# Patient Record
Sex: FEMALE | Race: WHITE | NOT HISPANIC OR LATINO | Employment: PART TIME | URBAN - METROPOLITAN AREA
[De-identification: names, ages, dates, MRNs, and addresses within clinical notes are randomized per-mention and may not be internally consistent; named-entity substitution may affect disease eponyms.]

---

## 2017-10-19 ENCOUNTER — HOSPITAL ENCOUNTER (OUTPATIENT)
Dept: URGENT CARE | Facility: CLINIC | Age: 27
Discharge: HOME | End: 2017-10-19
Attending: FAMILY MEDICINE | Admitting: FAMILY MEDICINE

## 2017-11-03 ENCOUNTER — ON CAMPUS - OUTPATIENT (OUTPATIENT)
Dept: URBAN - METROPOLITAN AREA HOSPITAL 85 | Facility: HOSPITAL | Age: 27
End: 2017-11-03
Payer: COMMERCIAL

## 2017-11-03 DIAGNOSIS — R10.33 PERIUMBILICAL PAIN: ICD-10-CM

## 2017-11-03 DIAGNOSIS — R11.0 NAUSEA: ICD-10-CM

## 2017-11-03 DIAGNOSIS — K59.00 CONSTIPATION, UNSPECIFIED: ICD-10-CM

## 2017-11-03 DIAGNOSIS — R31.9 HEMATURIA, UNSPECIFIED: ICD-10-CM

## 2017-11-03 PROCEDURE — 99204 OFFICE O/P NEW MOD 45 MIN: CPT | Performed by: NURSE PRACTITIONER

## 2017-11-29 ENCOUNTER — HOSPITAL ENCOUNTER (OUTPATIENT)
Dept: FAMILY MEDICINE CLINIC | Facility: CLINIC | Age: 27
Setting detail: SPECIMEN
Discharge: HOME | End: 2017-11-29
Attending: INTERNAL MEDICINE | Admitting: INTERNAL MEDICINE

## 2017-11-29 LAB
ALBUMIN SERPL-MCNC: 4.2 G/DL (ref 3.5–4.8)
ALBUMIN/GLOB SERPL: 1.4 {RATIO} (ref 1–1.7)
ALP SERPL-CCNC: 105 IU/L (ref 32–91)
ALT SERPL-CCNC: 24 IU/L (ref 14–54)
ANION GAP SERPL CALC-SCNC: 9.8 MMOL/L (ref 10–20)
AST SERPL-CCNC: 18 IU/L (ref 15–41)
BASOPHILS # BLD AUTO: 0.1 10*3/UL (ref 0–0.2)
BASOPHILS NFR BLD AUTO: 1 % (ref 0–2)
BILIRUB SERPL-MCNC: 0.4 MG/DL (ref 0.3–1.2)
BUN SERPL-MCNC: 10 MG/DL (ref 8–20)
BUN/CREAT SERPL: 16.7 (ref 5.4–26.2)
CALCIUM SERPL-MCNC: 9.3 MG/DL (ref 8.9–10.3)
CHLORIDE SERPL-SCNC: 106 MMOL/L (ref 101–111)
CONV CO2: 26 MMOL/L (ref 22–32)
CONV TOTAL PROTEIN: 7.1 G/DL (ref 6.1–7.9)
CREAT UR-MCNC: 0.6 MG/DL (ref 0.4–1)
DIFFERENTIAL METHOD BLD: (no result)
EOSINOPHIL # BLD AUTO: 0.1 10*3/UL (ref 0–0.3)
EOSINOPHIL # BLD AUTO: 1 % (ref 0–3)
ERYTHROCYTE [DISTWIDTH] IN BLOOD BY AUTOMATED COUNT: 13.8 % (ref 11.5–14.5)
GLOBULIN UR ELPH-MCNC: 2.9 G/DL (ref 2.5–3.8)
GLUCOSE SERPL-MCNC: 88 MG/DL (ref 65–99)
HCT VFR BLD AUTO: 44.8 % (ref 35–49)
HGB BLD-MCNC: 14.7 G/DL (ref 12–15)
LIPASE SERPL-CCNC: 25 U/L (ref 22–51)
LYMPHOCYTES # BLD AUTO: 3.1 10*3/UL (ref 0.8–4.8)
LYMPHOCYTES NFR BLD AUTO: 28 % (ref 18–42)
MCH RBC QN AUTO: 28.1 PG (ref 26–32)
MCHC RBC AUTO-ENTMCNC: 32.7 G/DL (ref 32–36)
MCV RBC AUTO: 85.7 FL (ref 80–94)
MONOCYTES # BLD AUTO: 0.9 10*3/UL (ref 0.1–1.3)
MONOCYTES NFR BLD AUTO: 8 % (ref 2–11)
NEUTROPHILS # BLD AUTO: 6.9 10*3/UL (ref 2.3–8.6)
NEUTROPHILS NFR BLD AUTO: 62 % (ref 50–75)
NRBC BLD AUTO-RTO: 0 /100{WBCS}
NRBC/RBC NFR BLD MANUAL: 0 10*3/UL
PLATELET # BLD AUTO: 257 10*3/UL (ref 150–450)
PMV BLD AUTO: 9.1 FL (ref 7.4–10.4)
POTASSIUM SERPL-SCNC: 3.8 MMOL/L (ref 3.6–5.1)
RBC # BLD AUTO: 5.23 10*6/UL (ref 4–5.4)
SODIUM SERPL-SCNC: 138 MMOL/L (ref 136–144)
WBC # BLD AUTO: (no result) 10*3/UL (ref 4.5–11.5)

## 2019-05-23 ENCOUNTER — CONVERSION ENCOUNTER (OUTPATIENT)
Dept: FAMILY MEDICINE CLINIC | Facility: CLINIC | Age: 29
End: 2019-05-23

## 2019-06-04 VITALS
SYSTOLIC BLOOD PRESSURE: 118 MMHG | HEART RATE: 74 BPM | RESPIRATION RATE: 16 BRPM | WEIGHT: 222.25 LBS | OXYGEN SATURATION: 74 % | BODY MASS INDEX: 39.37 KG/M2 | DIASTOLIC BLOOD PRESSURE: 72 MMHG

## 2019-06-06 NOTE — PROGRESS NOTES
Vital Signs:    Patient Profile:    29 Years Old Female  Height:     63 inches  Weight:     222.25 pounds  BMI:        39.37     O2 Sat:     74 %  Temp:       97.9 degrees F oral  Pulse rate: 74 / minute  Resp:       16 per minute  BP Sittin / 72  (left arm)        Problems: Active problems were reviewed with the patient during this visit.  Medications: Medications were reviewed with the patient during this visit.  Allergies: Allergies were reviewed with the patient during this visit.        Vitals Entered By: Anahi JACKSON  (May 23, 2019 1:52 PM)      Referring Provider:  Refugio Boyd MD  Primary Provider:  Fredy REA MD    CC:  back pain.    History of Present Illness:  Pt presents for 6 month h/o intermittent low to mid back pain. States that back pain initially started after falling down flight of stairs (lost her balance) when she was 34 weeks pregnant. Denies head trauma and fell on back. Has not gotten any imaging of   her back. States that the pain intensity has increased over the past 2 weeks. Denies any heavy lifting except for her 18lb baby. Denies parethesias,fevers, urinancy or bowel incontinence. Reports occasional shooting pains down the back portion of her legs   to her knees. Denies weakness or foot drop. Pain rated 7/10 and occasionally wakes her up at night for the past 3 weeks (2x/week) which she states feels like a muscle spasm. Takes 800mg of ibuprofen for the past couple of day and occasional tylenol. 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 Back pain is worse with bending over. Has noted no improvement with muscle relaxant.       Past Medical History:     Reviewed history from 10/03/2011 and no changes required:        pancreatitis    Past Surgical History:     Reviewed history from 2017 and no changes required:        cholecystectomy        Adenoids        Tonsillectomy    Family History Summary:      Reviewed history Last on 2019 and no changes  required:05/24/2019  Mother - Has No family history of clinical finding - Entered On: 9/21/2017      Social History:     Reviewed history from 04/12/2018 and no changes required:        Patient is a former smoker.        Passive Smoke: Y        Alcohol Use: N        Drug Use: N        HIV/High Risk: N        Regular Exercise: N        Hx Domestic Abuse: N        Muslim Affecting Care: N        Marital Status: Single        Children:         Occupation:         Risk Factors:     Smoked Tobacco Use:  Never smoker  Smokeless Tobacco Use:  Never      Review of Systems     The patient denies fever, weakness, blurring, diplopia, sore throat, hoarseness, chest pain, palpitations, peripheral edema, cough, wheezing, nausea, vomiting, diarrhea, constipation, change in bowel habits, abdominal pain, dysuria, hematuria, urinary   frequency and urinary hesitancy.        Physical Exam   Weight:  220.13  BP:  118/72 mm HG    Medication List:  MEDROL 4 MG ORAL TABLET THERAPY PACK (METHYLPREDNISOLONE) take 1 pack as directed  SERTRALINE  MG ORAL TABLET (SERTRALINE HCL) 1 1/2 tablet by mouth daily  ETODOLAC 400 MG ORAL TABLET (ETODOLAC) 1 tablet 2 times a day with food  RANITIDINE  MG ORAL TABLET (RANITIDINE HCL) Take one (1) tablet by mouth daily.  NORETHINDRONE ACET-ETHINYL EST 1-20 MG-MCG ORAL TABLET (NORETHINDRONE ACET-ETHINYL EST) taek as directed  PRENATAL/IRON TABLET (PRENATAL VIT-FE FUMARATE-FA TABS)       Surgical History   cholecystectomy  Adenoids  Tonsillectomy  ,    Risk Factors  Tobacco Use: Never smoker  Passive smoke exposure: yes  Exercise: no  Illicit Drug use: no      Physical Examination   General Appearance   In no acute distress.  Alert & oriented.  Behavior and affect appropriate to situation  Skin   HEENT   PERRLA, EOMI, Pharynx clear  Neck   Supple.  No adenopathy  Cardiovascular   Regular rate and rhythm  Lungs   Clear to auscultation  Back   Musculoskeletal   Gait and station normal, with  adequate muscle strength and tone.  Normal ROM to neck, back and extremities. Patellar reflexes 2+ bilaterally. Paraspinal muscle TTP (R>L) in mid to lower back. Negative straight leg test bilaterally.   Neurologic   Cranial nerves 2-12 grossly intact.   Psych   Alert and cooperative; normal mood and affect; normal attention span and concentration        Impression & Recommendations:    Problem # 1:  Back pain, chronic (ICD-724.5) (UVF45-Q80.89)  Presenting with 6 month h/o intermittent back pain which began after falling down stairs. Exam in unremarkable except for paraspinal TTP (R>L). Do no suspect disc herniation since pt had negative straight leg raise test bilaterally. Due to duration of   symptoms ordered thoracic and lumbar XR to evaluate for occult fracture or dislocation. No warning symptoms (eg fevers, motor deficits, urinary or bowel symptoms). Since pt has not had any improvement with muscle relaxant will prescribe Medrol dosepak.   Encouraged continued supportive care with heating pad. If pt does not improve with steroids and XRs are normal will refer to PT.   Her updated medication list for this problem includes:     Etodolac 400 Mg Oral Tablet (Etodolac) ..... 1 tablet 2 times a day with food      Medications Added to Medication List This Visit:  1)  Medrol 4 Mg Oral Tablet Therapy Pack (Methylprednisolone) .... Take 1 pack as directed                  Medication Administration    Orders Added:  1)  Ofc Vst, Est Level III [21902]        Electronically signed by Kirstie Daniel MD on 05/24/2019 at 8:46 AM  ________________________________________________________________________       Disclaimer: Converted Note message may not contain all data elements that existed in the legacy source system. Please see [employer] for the original note details.

## 2019-08-13 PROBLEM — F41.9 ANXIETY: Status: ACTIVE | Noted: 2017-11-29

## 2019-08-13 PROBLEM — M54.89 OTHER DORSALGIA: Status: ACTIVE | Noted: 2019-05-23

## 2019-08-13 PROBLEM — J38.5 LARYNGOSPASMS: Status: ACTIVE | Noted: 2018-04-12

## 2019-08-13 RX ORDER — NORETHINDRONE ACETATE AND ETHINYL ESTRADIOL 1; .02 MG/1; MG/1
TABLET ORAL
COMMUNITY
Start: 2017-11-29 | End: 2020-09-24

## 2019-08-13 RX ORDER — SERTRALINE HYDROCHLORIDE 100 MG/1
TABLET, FILM COATED ORAL EVERY 24 HOURS
COMMUNITY
Start: 2019-04-22 | End: 2019-12-01 | Stop reason: SDUPTHER

## 2019-08-13 RX ORDER — RANITIDINE 300 MG/1
TABLET ORAL EVERY 24 HOURS
COMMUNITY
Start: 2018-04-12 | End: 2020-09-24

## 2019-08-14 ENCOUNTER — OFFICE VISIT (OUTPATIENT)
Dept: FAMILY MEDICINE CLINIC | Facility: CLINIC | Age: 29
End: 2019-08-14

## 2019-08-14 ENCOUNTER — LAB (OUTPATIENT)
Dept: FAMILY MEDICINE CLINIC | Facility: CLINIC | Age: 29
End: 2019-08-14

## 2019-08-14 VITALS
HEART RATE: 72 BPM | BODY MASS INDEX: 40.92 KG/M2 | SYSTOLIC BLOOD PRESSURE: 120 MMHG | DIASTOLIC BLOOD PRESSURE: 80 MMHG | TEMPERATURE: 98.2 F | RESPIRATION RATE: 8 BRPM | WEIGHT: 231 LBS

## 2019-08-14 DIAGNOSIS — E28.2 PCOS (POLYCYSTIC OVARIAN SYNDROME): Primary | ICD-10-CM

## 2019-08-14 DIAGNOSIS — M54.50 LUMBAR BACK PAIN: ICD-10-CM

## 2019-08-14 DIAGNOSIS — E28.2 PCOS (POLYCYSTIC OVARIAN SYNDROME): ICD-10-CM

## 2019-08-14 LAB
ESTRADIOL SERPL HS-MCNC: 169 PG/ML
FSH SERPL-ACNC: 2.96 MIU/ML
LH SERPL-ACNC: 12.62 MIU/ML
PROGEST SERPL-MCNC: 10.05 NG/ML
TSH SERPL DL<=0.05 MIU/L-ACNC: 0.99 MIU/ML (ref 0.34–5.6)

## 2019-08-14 PROCEDURE — 83002 ASSAY OF GONADOTROPIN (LH): CPT | Performed by: INTERNAL MEDICINE

## 2019-08-14 PROCEDURE — 99214 OFFICE O/P EST MOD 30 MIN: CPT | Performed by: INTERNAL MEDICINE

## 2019-08-14 PROCEDURE — 82670 ASSAY OF TOTAL ESTRADIOL: CPT | Performed by: INTERNAL MEDICINE

## 2019-08-14 PROCEDURE — 83001 ASSAY OF GONADOTROPIN (FSH): CPT | Performed by: INTERNAL MEDICINE

## 2019-08-14 PROCEDURE — 84443 ASSAY THYROID STIM HORMONE: CPT | Performed by: INTERNAL MEDICINE

## 2019-08-14 PROCEDURE — 36415 COLL VENOUS BLD VENIPUNCTURE: CPT

## 2019-08-14 PROCEDURE — 84144 ASSAY OF PROGESTERONE: CPT | Performed by: INTERNAL MEDICINE

## 2019-08-14 NOTE — PROGRESS NOTES
Rooming Tab(CC,VS,Pt Hx,Fall Screen)  Chief Complaint   Patient presents with   • Generalized Body Aches       Subjective    Pt here because still no cycle- increased acne and sleep issues.   No post partum-  Issues- on SSRI and doing well.  normal libido   also never xray as wasn't sure that order had been sent-  Was  Still having lower back pain-   No radiating pain most days- jsut stays in lower back/buttocks region. Lifting 2 children now  I have reviewed and updated her medications, medical history and problem list during today's office visit.     Patient Care Team:  Kirstie Daniel MD as PCP - General    Problem List Tab  Medications Tab  Synopsis Tab  Chart Review Tab  Care Everywhere Tab  Immunizations Tab  Patient History Tab    Social History     Tobacco Use   • Smoking status: Light Tobacco Smoker     Types: Cigarettes   • Smokeless tobacco: Never Used   Substance Use Topics   • Alcohol use: No     Frequency: Never       Review of Systems   Constitutional: Negative for fatigue.   Cardiovascular: Negative for chest pain.   Psychiatric/Behavioral: Positive for sleep disturbance.       Objective     Rooming Tab(CC,VS,Pt Hx,Fall Screen)  /80   Pulse 72   Temp 98.2 °F (36.8 °C) (Oral)   Resp 8   Wt 105 kg (231 lb)   BMI 40.92 kg/m²     Body mass index is 40.92 kg/m².    Physical Exam   Constitutional: She appears well-developed and well-nourished.   HENT:   Head: Normocephalic.   Eyes: Pupils are equal, round, and reactive to light.   Neck: Normal range of motion. Neck supple.   Cardiovascular: Normal rate and regular rhythm.   No murmur heard.  Pulmonary/Chest: Effort normal and breath sounds normal. No respiratory distress.   Abdominal: Soft. Bowel sounds are normal. She exhibits no distension.   Musculoskeletal: She exhibits tenderness.   Tender  Midline    Skin: Capillary refill takes less than 2 seconds.   Back acne   Psychiatric: She has a normal mood and affect.   Nursing note and  vitals reviewed.       Statin Choice Calculator  Data Reviewed:    Xr Spine Lumbar Ap & Lateral    Result Date: 8/15/2019  Impression: Negative lumbosacral spine.  Electronically Signed By-Lavell Piper On:8/15/2019 4:16 PM This report was finalized on [id number] by  Lavell Piper, .            Lab Results   Component Value Date    TSH 0.990 08/14/2019      Assessment/Plan   Order Review Tab  Health Maintenance Tab  Patient Plan/Order Tab  Diagnoses and all orders for this visit:    1. PCOS (polycystic ovarian syndrome) (Primary)  Comments:  check labs  Orders:  -     Estradiol; Future  -     Follicle Stimulating Hormone  -     Luteinizing Hormone; Future  -     Progesterone; Future  -     TSH    2. Lumbar back pain  Comments:  check xray as fell hard  in NOvember and pain started then  Orders:  -     XR Spine Lumbar AP & Lateral; Future        Wrapup Tab  Return in about 6 months (around 2/14/2020).           They were informed of the diagnosis and treatment plan and directed to f/u for any further problems or concerns.  Recommend increase exercise and weight reduction

## 2019-08-15 ENCOUNTER — HOSPITAL ENCOUNTER (OUTPATIENT)
Dept: GENERAL RADIOLOGY | Facility: HOSPITAL | Age: 29
Discharge: HOME | End: 2019-08-15
Admitting: INTERNAL MEDICINE

## 2019-08-15 DIAGNOSIS — M54.50 LUMBAR BACK PAIN: ICD-10-CM

## 2019-08-15 PROCEDURE — 72100 X-RAY EXAM L-S SPINE 2/3 VWS: CPT

## 2019-08-21 ENCOUNTER — TELEPHONE (OUTPATIENT)
Dept: FAMILY MEDICINE CLINIC | Facility: CLINIC | Age: 29
End: 2019-08-21

## 2019-08-22 NOTE — TELEPHONE ENCOUNTER
Xray shows just muscular issues- spine bones and disc look good    Hormones show luteal phase - she just isn't have an LH surge- would do birth control pills to start regular cycles if doesn't start on own soon.

## 2019-09-24 RX ORDER — TIZANIDINE 4 MG/1
TABLET ORAL
Qty: 30 TABLET | Refills: 1 | Status: SHIPPED | OUTPATIENT
Start: 2019-09-24 | End: 2020-01-06

## 2019-12-02 RX ORDER — SERTRALINE HYDROCHLORIDE 100 MG/1
TABLET, FILM COATED ORAL
Qty: 45 TABLET | Refills: 3 | Status: SHIPPED | OUTPATIENT
Start: 2019-12-02 | End: 2020-04-23

## 2019-12-26 ENCOUNTER — TELEPHONE (OUTPATIENT)
Dept: FAMILY MEDICINE CLINIC | Facility: CLINIC | Age: 29
End: 2019-12-26

## 2019-12-26 NOTE — TELEPHONE ENCOUNTER
Patient fell and sprained her right ankle.  She went to the ER last weekend and forgot to get a work note.  She called our on-call service on 12/21 and spoke with one of our male doctors who said we would provide her a work note.  Needs the work note to be off 12/23-12/27 and it needs faxed to [phone].  She is trying to walk with crutches and cannot drive yet.

## 2019-12-27 ENCOUNTER — DOCUMENTATION (OUTPATIENT)
Dept: FAMILY MEDICINE CLINIC | Facility: CLINIC | Age: 29
End: 2019-12-27

## 2020-01-06 RX ORDER — TIZANIDINE 4 MG/1
TABLET ORAL
Qty: 30 TABLET | Refills: 6 | Status: SHIPPED | OUTPATIENT
Start: 2020-01-06 | End: 2020-09-24

## 2020-01-09 DIAGNOSIS — S63.642A SPRAIN OF METACARPOPHALANGEAL (MCP) JOINT OF LEFT THUMB, INITIAL ENCOUNTER: Primary | ICD-10-CM

## 2020-01-09 RX ORDER — OSELTAMIVIR PHOSPHATE 75 MG/1
75 CAPSULE ORAL DAILY
Qty: 10 CAPSULE | Refills: 0 | Status: SHIPPED | OUTPATIENT
Start: 2020-01-09 | End: 2020-09-24

## 2020-01-24 ENCOUNTER — TELEPHONE (OUTPATIENT)
Dept: FAMILY MEDICINE CLINIC | Facility: CLINIC | Age: 30
End: 2020-01-24

## 2020-01-24 RX ORDER — KETOCONAZOLE 20 MG/G
CREAM TOPICAL DAILY
Qty: 30 G | Refills: 1 | Status: SHIPPED | OUTPATIENT
Start: 2020-01-24 | End: 2020-09-24

## 2020-01-24 RX ORDER — NAFTIFINE HYDROCHLORIDE 20 MG/G
1 CREAM TOPICAL DAILY
Qty: 45 G | Refills: 1 | Status: SHIPPED | OUTPATIENT
Start: 2020-01-24 | End: 2020-09-24

## 2020-03-24 ENCOUNTER — OFFICE VISIT (OUTPATIENT)
Dept: FAMILY MEDICINE CLINIC | Facility: CLINIC | Age: 30
End: 2020-03-24

## 2020-03-24 DIAGNOSIS — R59.1 LYMPHADENOPATHY: Primary | ICD-10-CM

## 2020-03-24 DIAGNOSIS — L02.92 BOIL: Primary | ICD-10-CM

## 2020-03-24 LAB
BASOPHILS # BLD AUTO: 0.04 10*3/MM3 (ref 0–0.2)
BASOPHILS NFR BLD AUTO: 0.5 % (ref 0–1.5)
CRP SERPL-MCNC: 0.28 MG/DL (ref 0–0.5)
DEPRECATED RDW RBC AUTO: 40.4 FL (ref 37–54)
EOSINOPHIL # BLD AUTO: 0.12 10*3/MM3 (ref 0–0.4)
EOSINOPHIL NFR BLD AUTO: 1.6 % (ref 0.3–6.2)
ERYTHROCYTE [DISTWIDTH] IN BLOOD BY AUTOMATED COUNT: 13.1 % (ref 12.3–15.4)
HCT VFR BLD AUTO: 44.4 % (ref 34–46.6)
HGB BLD-MCNC: 14.7 G/DL (ref 12–15.9)
IMM GRANULOCYTES # BLD AUTO: 0.03 10*3/MM3 (ref 0–0.05)
IMM GRANULOCYTES NFR BLD AUTO: 0.4 % (ref 0–0.5)
LYMPHOCYTES # BLD AUTO: 2.66 10*3/MM3 (ref 0.7–3.1)
LYMPHOCYTES NFR BLD AUTO: 35.1 % (ref 19.6–45.3)
MCH RBC QN AUTO: 28.3 PG (ref 26.6–33)
MCHC RBC AUTO-ENTMCNC: 33.1 G/DL (ref 31.5–35.7)
MCV RBC AUTO: 85.4 FL (ref 79–97)
MONOCYTES # BLD AUTO: 0.48 10*3/MM3 (ref 0.1–0.9)
MONOCYTES NFR BLD AUTO: 6.3 % (ref 5–12)
NEUTROPHILS # BLD AUTO: 4.25 10*3/MM3 (ref 1.7–7)
NEUTROPHILS NFR BLD AUTO: 56.1 % (ref 42.7–76)
NRBC BLD AUTO-RTO: 0 /100 WBC (ref 0–0.2)
PLATELET # BLD AUTO: 255 10*3/MM3 (ref 140–450)
PMV BLD AUTO: 11 FL (ref 6–12)
RBC # BLD AUTO: 5.2 10*6/MM3 (ref 3.77–5.28)
WBC NRBC COR # BLD: 7.58 10*3/MM3 (ref 3.4–10.8)

## 2020-03-24 PROCEDURE — 85025 COMPLETE CBC W/AUTO DIFF WBC: CPT | Performed by: INTERNAL MEDICINE

## 2020-03-24 PROCEDURE — 99213 OFFICE O/P EST LOW 20 MIN: CPT | Performed by: INTERNAL MEDICINE

## 2020-03-24 PROCEDURE — 86140 C-REACTIVE PROTEIN: CPT | Performed by: INTERNAL MEDICINE

## 2020-03-25 VITALS — SYSTOLIC BLOOD PRESSURE: 122 MMHG | DIASTOLIC BLOOD PRESSURE: 80 MMHG

## 2020-03-25 PROBLEM — R59.1 LYMPHADENOPATHY: Status: ACTIVE | Noted: 2020-03-25

## 2020-03-25 NOTE — PROGRESS NOTES
Rooming Tab(CC,VS,Pt Hx,Fall Screen)  Chief Complaint   Patient presents with   • Pain       Subjective   PT presents for right axilla pain- noticed knot a couple weeks ago and not going away. Not red or hot. No known bug /spider bite.  Does have breast care in family and that concerns her.  I have reviewed and updated her medications, medical history and problem list during today's office visit.     Patient Care Team:  Kirstie Daniel MD as PCP - General    Problem List Tab  Medications Tab  Synopsis Tab  Chart Review Tab  Care Everywhere Tab  Immunizations Tab  Patient History Tab    Social History     Tobacco Use   • Smoking status: Light Tobacco Smoker     Packs/day: 0.25     Types: Cigarettes   • Smokeless tobacco: Never Used   Substance Use Topics   • Alcohol use: No     Frequency: Never       Review of Systems   Constitutional: Negative for fatigue.   Respiratory: Negative for apnea.    Cardiovascular: Negative for chest pain.   Gastrointestinal: Negative for abdominal distention.   Musculoskeletal: Positive for myalgias.   Hematological: Positive for adenopathy.       Objective     Rooming Tab(CC,VS,Pt Hx,Fall Screen)  /80     There is no height or weight on file to calculate BMI.    Physical Exam   Constitutional: She is oriented to person, place, and time. She appears well-developed and well-nourished.   HENT:   Head: Normocephalic and atraumatic.   Right Ear: Tympanic membrane normal.   Left Ear: Tympanic membrane normal.   Eyes: Pupils are equal, round, and reactive to light.   Neck: Normal range of motion. Neck supple.   Cardiovascular: Normal rate and regular rhythm.   No murmur heard.  Pulmonary/Chest: Effort normal and breath sounds normal.   Abdominal: Soft. Bowel sounds are normal. She exhibits no distension.   Musculoskeletal:   Right axilla with medial Lad palpable- tender to palpate. No redness or warmth. Not in breast tissue   Neurological: She is oriented to person, place, and  time.   Skin: Capillary refill takes less than 2 seconds.   Nursing note and vitals reviewed.       Statin Choice Calculator  Data Reviewed:               Lab Results   Component Value Date    WBC 7.58 03/24/2020    RBC 5.20 03/24/2020    HCT 44.4 03/24/2020    MCV 85.4 03/24/2020    MCH 28.3 03/24/2020      Assessment/Plan   Order Review Tab  Health Maintenance Tab  Patient Plan/Order Tab  Diagnoses and all orders for this visit:    1. Lymphadenopathy (Primary)  Comments:  will check cbc and crp- if normal- give 1 cycle and see if imporves- if doesn't will do US axilla in April        Wrapup Tab  Return if symptoms worsen or fail to improve.       They were informed of the diagnosis and treatment plan and directed to f/u for any further problems or concerns.

## 2020-04-01 ENCOUNTER — TELEPHONE (OUTPATIENT)
Dept: FAMILY MEDICINE CLINIC | Facility: CLINIC | Age: 30
End: 2020-04-01

## 2020-04-23 ENCOUNTER — TELEPHONE (OUTPATIENT)
Dept: FAMILY MEDICINE CLINIC | Facility: CLINIC | Age: 30
End: 2020-04-23

## 2020-04-23 ENCOUNTER — OFFICE VISIT (OUTPATIENT)
Dept: FAMILY MEDICINE CLINIC | Facility: CLINIC | Age: 30
End: 2020-04-23

## 2020-04-23 DIAGNOSIS — A08.39 GASTROENTERITIS DUE TO COVID-19 VIRUS: ICD-10-CM

## 2020-04-23 DIAGNOSIS — U07.1 GASTROENTERITIS DUE TO COVID-19 VIRUS: ICD-10-CM

## 2020-04-23 DIAGNOSIS — J40 BRONCHITIS: Primary | ICD-10-CM

## 2020-04-23 PROCEDURE — 87635 SARS-COV-2 COVID-19 AMP PRB: CPT | Performed by: PHYSICIAN ASSISTANT

## 2020-04-23 RX ORDER — AMOXICILLIN 875 MG/1
875 TABLET, COATED ORAL 2 TIMES DAILY
Qty: 20 TABLET | Refills: 0 | Status: SHIPPED | OUTPATIENT
Start: 2020-04-23 | End: 2020-05-03

## 2020-04-23 RX ORDER — PROMETHAZINE HYDROCHLORIDE 25 MG/1
25 TABLET ORAL EVERY 6 HOURS PRN
Qty: 25 TABLET | Refills: 2 | Status: SHIPPED | OUTPATIENT
Start: 2020-04-23 | End: 2020-09-24

## 2020-04-23 NOTE — TELEPHONE ENCOUNTER
Patient was just in the office to see , and she forgot that she needed a doctors note for her job.    Patient doesn't know how long  wants her to be off work    Needs the letter to state how long she is going to be off work as of today till who knows when..    If she could please fax it to her job at   [phone]

## 2020-04-23 NOTE — PROGRESS NOTES
Rooming Tab(CC,VS,Pt Hx,Fall Screen)  Chief Complaint   Patient presents with   • URI   • Fever       Subjective   Pt started with emesis and diarrhea x4 days.  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 No hematemesis or blood in stool.  Multiple times a day-= getting dehydrated  Whole body hurts-  Now with 104 fever this morning. Cough productive thick white. And sore throat. Keeping  sprite down only- no taste of  Big red or mint gum.   Chills and sweats.  Home with both kids- has not been back to client since Monday.  No pain with swallowing sprite. No rash, no syncope  I have reviewed and updated her medications, medical history and problem list during today's office visit.     Patient Care Team:  Kirstie Daniel MD as PCP - General    Problem List Tab  Medications Tab  Synopsis Tab  Chart Review Tab  Care Everywhere Tab  Immunizations Tab  Patient History Tab    Social History     Tobacco Use   • Smoking status: Light Tobacco Smoker     Packs/day: 0.25     Types: Cigarettes   • Smokeless tobacco: Never Used   Substance Use Topics   • Alcohol use: No     Frequency: Never       Review of Systems   HENT: Positive for congestion.    Cardiovascular: Negative for palpitations.       Objective     Rooming Tab(CC,VS,Pt Hx,Fall Screen)  LMP 04/02/2020     There is no height or weight on file to calculate BMI.    Physical Exam   Constitutional: She is oriented to person, place, and time.   Warm to touch and sweating   HENT:   Head: Atraumatic.   Cardiovascular: Normal rate and regular rhythm.   Pulmonary/Chest: Effort normal. No respiratory distress. She has wheezes.   Left sided post exhale scattered wheeze   Abdominal: She exhibits no distension.   Neurological: She is oriented to person, place, and time.   Skin: Capillary refill takes less than 2 seconds.   Psychiatric: She has a normal mood and affect.   Nursing note and vitals reviewed.        Statin Choice Calculator  Data Reviewed:               Lab Results   Component Value Date    WBC 7.58 03/24/2020    RBC 5.20 03/24/2020    HCT 44.4 03/24/2020    MCV 85.4 03/24/2020    MCH 28.3 03/24/2020      Assessment/Plan   Order Review Tab  Health Maintenance Tab  Patient Plan/Order Tab  Diagnoses and all orders for this visit:    1. Bronchitis (Primary)  Comments:  will treat with amoxil with pharyngitis and bronchitis- recommend getting COVID testing as been with elderly clients as well - will send down to [facility] today    2. Gastroenteritis due to COVID-19 virus    Other orders  -     promethazine (PHENERGAN) 25 MG tablet; Take 1 tablet by mouth Every 6 (Six) Hours As Needed for Nausea or Vomiting.  Dispense: 25 tablet; Refill: 2  -     amoxicillin (AMOXIL) 875 MG tablet; Take 1 tablet by mouth 2 (Two) Times a Day for 10 days.  Dispense: 20 tablet; Refill: 0        Wrapup Tab  No follow-ups on file.       They were informed of the diagnosis and treatment plan and directed to f/u for any further problems or concerns.

## 2020-04-23 NOTE — TELEPHONE ENCOUNTER
Please give work note from 4/20-/427.  If her test comes back positive then will change for 14 days after testingas well

## 2020-04-25 ENCOUNTER — TELEPHONE (OUTPATIENT)
Dept: URGENT CARE | Facility: CLINIC | Age: 30
End: 2020-04-25

## 2020-04-25 NOTE — TELEPHONE ENCOUNTER
----- Message from Thad Grissom MD sent at 4/25/2020 10:27 AM EDT -----  Please call the patient.    Testing negative, but this does NOT rule out COVID.  Continue home isolation.  Contact PCP for additional instructions.  See doctor or go to ER for any worsening.

## 2020-09-24 ENCOUNTER — OFFICE VISIT (OUTPATIENT)
Dept: FAMILY MEDICINE CLINIC | Facility: CLINIC | Age: 30
End: 2020-09-24

## 2020-09-24 VITALS
HEART RATE: 73 BPM | DIASTOLIC BLOOD PRESSURE: 76 MMHG | OXYGEN SATURATION: 97 % | SYSTOLIC BLOOD PRESSURE: 128 MMHG | BODY MASS INDEX: 40.57 KG/M2 | TEMPERATURE: 98.6 F | RESPIRATION RATE: 16 BRPM | WEIGHT: 229 LBS

## 2020-09-24 DIAGNOSIS — L73.2 HYDRADENITIS: Primary | ICD-10-CM

## 2020-09-24 PROCEDURE — 99213 OFFICE O/P EST LOW 20 MIN: CPT | Performed by: INTERNAL MEDICINE

## 2020-09-24 PROCEDURE — 87205 SMEAR GRAM STAIN: CPT | Performed by: INTERNAL MEDICINE

## 2020-09-24 PROCEDURE — 87070 CULTURE OTHR SPECIMN AEROBIC: CPT | Performed by: INTERNAL MEDICINE

## 2020-09-24 PROCEDURE — 87147 CULTURE TYPE IMMUNOLOGIC: CPT | Performed by: INTERNAL MEDICINE

## 2020-09-24 RX ORDER — ALUMINUM CHLORIDE 20 %
SOLUTION, NON-ORAL TOPICAL NIGHTLY
Qty: 60 ML | Refills: 2 | OUTPATIENT
Start: 2020-09-24 | End: 2021-04-27

## 2020-09-24 RX ORDER — SULFAMETHOXAZOLE AND TRIMETHOPRIM 800; 160 MG/1; MG/1
1 TABLET ORAL EVERY 12 HOURS
COMMUNITY
Start: 2020-09-14 | End: 2020-09-24

## 2020-09-26 LAB
BACTERIA SPEC AEROBE CULT: ABNORMAL
GRAM STN SPEC: ABNORMAL
GRAM STN SPEC: ABNORMAL

## 2020-11-05 ENCOUNTER — OFFICE VISIT (OUTPATIENT)
Dept: FAMILY MEDICINE CLINIC | Facility: CLINIC | Age: 30
End: 2020-11-05

## 2020-11-05 VITALS
TEMPERATURE: 97.5 F | RESPIRATION RATE: 16 BRPM | BODY MASS INDEX: 39.89 KG/M2 | WEIGHT: 225.2 LBS | HEART RATE: 71 BPM | OXYGEN SATURATION: 97 % | DIASTOLIC BLOOD PRESSURE: 84 MMHG | SYSTOLIC BLOOD PRESSURE: 138 MMHG

## 2020-11-05 DIAGNOSIS — L73.2 HYDRADENITIS: Primary | ICD-10-CM

## 2020-11-05 DIAGNOSIS — R59.1 LYMPHADENOPATHY: ICD-10-CM

## 2020-11-05 PROCEDURE — 99213 OFFICE O/P EST LOW 20 MIN: CPT | Performed by: INTERNAL MEDICINE

## 2020-11-05 RX ORDER — SULFAMETHOXAZOLE AND TRIMETHOPRIM 800; 160 MG/1; MG/1
1 TABLET ORAL 2 TIMES DAILY
Qty: 20 TABLET | Refills: 0 | OUTPATIENT
Start: 2020-11-05 | End: 2021-04-27

## 2020-11-05 NOTE — PROGRESS NOTES
Rooming Tab(CC,VS,Pt Hx,Fall Screen)  Chief Complaint   Patient presents with   • Recurrent Skin Infections       Subjective   Sweating non stop- has tried drysol and everything female and male-  No help   shaves only 1 week.  Still with the left axilla boil - not as hard - tender- bra irritates it so takes off at home.   I have reviewed and updated her medications, medical history and problem list during today's office visit.     Patient Care Team:  Kirstie Daniel MD as PCP - General    Problem List Tab  Medications Tab  Synopsis Tab  Chart Review Tab  Care Everywhere Tab  Immunizations Tab  Patient History Tab    Social History     Tobacco Use   • Smoking status: Light Tobacco Smoker     Packs/day: 0.25     Types: Cigarettes   • Smokeless tobacco: Never Used   Substance Use Topics   • Alcohol use: No     Frequency: Never       Review of Systems   Constitutional: Negative for fatigue.   Cardiovascular: Negative for chest pain.   Skin: Positive for rash.       Objective     Rooming Tab(CC,VS,Pt Hx,Fall Screen)  /84 (BP Location: Left arm, Patient Position: Sitting, Cuff Size: Adult)   Pulse 71   Temp 97.5 °F (36.4 °C) (Temporal)   Resp 16   Wt 102 kg (225 lb 3.2 oz)   SpO2 97%   BMI 39.89 kg/m²     Body mass index is 39.89 kg/m².    Physical Exam  Vitals signs and nursing note reviewed.   Constitutional:       Appearance: She is well-developed.   HENT:      Head: Normocephalic and atraumatic.      Right Ear: Tympanic membrane normal.      Left Ear: Tympanic membrane normal.   Eyes:      Pupils: Pupils are equal, round, and reactive to light.   Neck:      Musculoskeletal: Normal range of motion and neck supple.   Cardiovascular:      Rate and Rhythm: Normal rate and regular rhythm.      Heart sounds: No murmur.   Pulmonary:      Effort: Pulmonary effort is normal.      Breath sounds: Normal breath sounds.   Abdominal:      General: Bowel sounds are normal. There is no distension.      Palpations:  Abdomen is soft.   Skin:     Capillary Refill: Capillary refill takes less than 2 seconds.      Findings: Erythema and lesion present.      Comments: Enlarged LAD with mild redness and irritation in armpit   Neurological:      Mental Status: She is oriented to person, place, and time.          Statin Choice Calculator  Data Reviewed:                   Assessment/Plan   Order Review Tab  Health Maintenance Tab  Patient Plan/Order Tab  Diagnoses and all orders for this visit:    1. Hydradenitis (Primary)  Comments:  will give bactrim   use heat as needed   no enlarged LAD currently    2. Lymphadenopathy    Other orders  -     sulfamethoxazole-trimethoprim (Bactrim DS) 800-160 MG per tablet; Take 1 tablet by mouth 2 (Two) Times a Day.  Dispense: 20 tablet; Refill: 0        Wrapup Tab  Return if symptoms worsen or fail to improve.       They were informed of the diagnosis and treatment plan and directed to f/u for any further problems or concerns.

## 2021-04-27 PROCEDURE — U0003 INFECTIOUS AGENT DETECTION BY NUCLEIC ACID (DNA OR RNA); SEVERE ACUTE RESPIRATORY SYNDROME CORONAVIRUS 2 (SARS-COV-2) (CORONAVIRUS DISEASE [COVID-19]), AMPLIFIED PROBE TECHNIQUE, MAKING USE OF HIGH THROUGHPUT TECHNOLOGIES AS DESCRIBED BY CMS-2020-01-R: HCPCS | Performed by: NURSE PRACTITIONER

## 2021-05-04 ENCOUNTER — OFFICE VISIT (OUTPATIENT)
Dept: FAMILY MEDICINE CLINIC | Facility: CLINIC | Age: 31
End: 2021-05-04

## 2021-05-04 VITALS
DIASTOLIC BLOOD PRESSURE: 90 MMHG | HEART RATE: 93 BPM | TEMPERATURE: 97.1 F | OXYGEN SATURATION: 98 % | RESPIRATION RATE: 16 BRPM | WEIGHT: 226 LBS | BODY MASS INDEX: 40.04 KG/M2 | SYSTOLIC BLOOD PRESSURE: 130 MMHG | HEIGHT: 63 IN

## 2021-05-04 DIAGNOSIS — F41.9 ANXIETY: Primary | ICD-10-CM

## 2021-05-04 DIAGNOSIS — L73.2 HYDRADENITIS: ICD-10-CM

## 2021-05-04 PROCEDURE — 99213 OFFICE O/P EST LOW 20 MIN: CPT | Performed by: INTERNAL MEDICINE

## 2021-05-04 RX ORDER — LEVONORGESTREL / ETHINYL ESTRADIOL AND ETHINYL ESTRADIOL 150-30(84)
1 KIT ORAL DAILY
Qty: 90 EACH | Refills: 3 | Status: SHIPPED | OUTPATIENT
Start: 2021-05-04 | End: 2022-10-26

## 2021-08-17 ENCOUNTER — TELEPHONE (OUTPATIENT)
Dept: FAMILY MEDICINE CLINIC | Facility: CLINIC | Age: 31
End: 2021-08-17

## 2021-08-17 RX ORDER — FLUOXETINE HYDROCHLORIDE 20 MG/1
20 CAPSULE ORAL DAILY
Qty: 30 CAPSULE | Refills: 3 | Status: SHIPPED | OUTPATIENT
Start: 2021-08-17 | End: 2022-10-26

## 2021-08-17 NOTE — TELEPHONE ENCOUNTER
Caller: Jessie Byrd    Relationship: Self    Best call back number: [phone]     What medications are you currently taking:   Current Outpatient Medications on File Prior to Visit   Medication Sig Dispense Refill   • albuterol sulfate  (90 Base) MCG/ACT inhaler Inhale 2 puffs 4 (Four) Times a Day. 18 g 0   • brompheniramine-pseudoephedrine-DM (Bromfed DM) 30-2-10 MG/5ML syrup Take 5 mL by mouth 4 (Four) Times a Day As Needed for Congestion or Cough. 119 mL 0   • Levonorgest-Eth Estrad 91-Day 0.15-0.03 &0.01 MG tablet Take 1 tablet by mouth Daily. 90 each 3   • sertraline (Zoloft) 50 MG tablet Take 1 tablet by mouth Daily. 30 tablet 3     No current facility-administered medications on file prior to visit.          When did you start taking these medications: 3 MONTHS     Which medication are you concerned about: MOHIT     Who prescribed you this medication: DR. BOBBY    What are your concerns: MS. BYRD DISCONTINUED TAKING ZOLOFT BECAUSE IT WAS CAUSING HER MIGRAINES EVERYDAY, SHE WOULD LIKE TO KNOW IF THERE IS A DIFFERENT MEDICATION THAT SHE COULD TAKE FOR HER DEPRESSION, SHE SAYS SHE FEELS LIKE SHE IS NEEDING TO GET HER MEDICATION BACK IN HER SYSTEM.     How long have you had these concerns: 1 WEEK       [id number] - [place], [place] - [address] - [phone]  - [phone]   [phone]

## 2021-09-02 ENCOUNTER — TELEPHONE (OUTPATIENT)
Dept: FAMILY MEDICINE CLINIC | Facility: CLINIC | Age: 31
End: 2021-09-02

## 2021-09-02 NOTE — TELEPHONE ENCOUNTER
Caller: Jessie Villafana    Relationship: Self    Best call back number [phone]    What medication are you requesting: COUGH MEDICATION    What are your current symptoms: COUGH     How long have you been experiencing symptoms: 3 DAYS      If a prescription is needed, what is your preferred pharmacy and phone number: [id number] - [place], [place] - [address] - [phone]  - [phone] FX     Additional notes: SON WAS DIAGNOSED WITH COVID ON 8/31

## 2021-09-02 NOTE — TELEPHONE ENCOUNTER
HUB TO SHARE: LEFT VM ASKING PATIENT TO CALL BACK TO LET US KNOW IF SHE HAS BEEN TESTED FOR COVID AND WHAT ALL SYMPTOMS SHE HAS

## 2021-09-11 NOTE — TELEPHONE ENCOUNTER
Patient called stating that she has another yeast infection under left breast. Patient states that skin is cracked and burning. Patient is requesting meds. Patient states that nystatin does not work, but cannot remember the name of the cream that you sent in before.    pt is having abdominal ,  epigastric and  vomiting for two days and getting worse  . history of DM, asthma .

## 2022-01-21 PROCEDURE — U0004 COV-19 TEST NON-CDC HGH THRU: HCPCS | Performed by: FAMILY MEDICINE

## 2022-05-09 ENCOUNTER — TELEPHONE (OUTPATIENT)
Dept: FAMILY MEDICINE CLINIC | Facility: CLINIC | Age: 32
End: 2022-05-09

## 2022-05-09 DIAGNOSIS — Z20.822 SUSPECTED COVID-19 VIRUS INFECTION: ICD-10-CM

## 2022-05-09 NOTE — TELEPHONE ENCOUNTER
Caller: Jessie Villafana    Relationship to patient: Self    Best call back number: [phone]    Date of exposure: EXPOSURE FROM [employer]    Date of positive COVID19 test: 05/09/22, TESTED AT [facility]     Date if possible COVID19 exposure: N/A    COVID19 symptoms: BACK AND CHEST PAIN, SHORTNESS OF BREATHE, COUGHING    SORENESS IN CHEST FROM COUGHING, SAID SHE FEELS LIKE SHE NEEDS TO COUGH SOMETHING UP BUT CAN'T     SWEATING    Date of initial quarantine: 05/09/22    Additional information or concerns: PATIENT SAID SHE'S BEEN SICK SINCE THE WEEKEND, AND HER FEVER BROKE LAST NIGHT     SHE SAID THAT SHE IS WANTING TO SEE IF MEDICATION CAN BE SENT IN FOR HER     HUB ADVISED IF PATIENT BECAME SHORT OF BREATHE TO GO TO THE EMERGENCY ROOM     What is the patients preferred pharmacy: [id number] - [place], [place] - [address] - [phone] [facility] [phone]   [phone]

## 2022-05-09 NOTE — TELEPHONE ENCOUNTER
Caller: Jessie Villafana A    Relationship to patient: Self    Best call back number: [phone]    Patient is needing: PATIENT CALLED AND SAID THAT THE PHARMACY SAID THIS MEDICATION NEEDS A PRIOR AUTHORIZATION     MEDICATION:   Nirmatrelvir & Ritonavir (PAXLOVID) 20 x 150 MG & 10 x 100MG tablet therapy pack tablet  3 tablet, 2 Times Daily

## 2022-05-10 RX ORDER — PREDNISONE 20 MG/1
20 TABLET ORAL 2 TIMES DAILY
Qty: 10 TABLET | Refills: 0 | Status: SHIPPED | OUTPATIENT
Start: 2022-05-10 | End: 2022-05-15

## 2022-05-10 RX ORDER — BENZONATATE 200 MG/1
CAPSULE ORAL
Qty: 30 CAPSULE | Refills: 0 | Status: SHIPPED | OUTPATIENT
Start: 2022-05-10 | End: 2022-10-26

## 2022-05-10 NOTE — TELEPHONE ENCOUNTER
Tell pt that I sent in prednisone and coughing meds as doesn't look like the covid pack is going to be covered by her insurance

## 2022-10-25 ENCOUNTER — TELEPHONE (OUTPATIENT)
Dept: FAMILY MEDICINE CLINIC | Facility: CLINIC | Age: 32
End: 2022-10-25

## 2022-10-25 NOTE — TELEPHONE ENCOUNTER
[facility] staff attempted to follow warm transfer process and was unsuccessful     Caller: Jessie Villafana    Relationship to patient: Self    Best call back number: [phone]    Patient is needing: PATIENT STATES SHE WAS TOLD THERE WAS AN APPOINTMENT AVAILABLE TOMORROW WITH DR. BOBBY AT 1:45 P.M. AND CALLED IN TO CONFIRM APPOINTMENT.     [facility] COULD NOT SEE APPOINTMENT.     PLEASE CALL AND ADVISE.

## 2022-10-25 NOTE — TELEPHONE ENCOUNTER
PATIENT CALLED REQUESTING AN APPOINTMENT FOR INCREASED ANXIETY AND HUB OFFERED AN APPOINTMENT TOMORROW WITH CMM BUT THEN PATIENT BEGAN TO CRY AND SAY SHE CANNOT WAIT UNTIL TOMORROW AND WAS TRANSFERRED TO ME. PATIENT WAS CALM WHILE I SPOKE TO HER. SHE STATED THAT SHE HAS A LOT OF ANXIETY DUE TO RECENT LIFE CHANGES AND THAT SHE HAS BEEN SMOKING MARIJUANA SINCE SHE WAS 15 YEARS OLD BUT RECENTLY QUIT BECAUSE SHE IS GOING TO HAVE TO GO TO COURT SOON FOR CUSTODY AND IS WORRIED ABOUT BEING DRUG TESTED. SHE SAID THE MARIJUANA HELPS WITH HER ANXIETY AND SHE IS REALLY STRUGGLING WITHOUT IT AND WANTS TO BE SEEN TO BE PRESCRIBED A PRN NERVE PILL.

## 2022-10-26 ENCOUNTER — OFFICE VISIT (OUTPATIENT)
Dept: FAMILY MEDICINE CLINIC | Facility: CLINIC | Age: 32
End: 2022-10-26

## 2022-10-26 ENCOUNTER — LAB (OUTPATIENT)
Dept: FAMILY MEDICINE CLINIC | Facility: CLINIC | Age: 32
End: 2022-10-26

## 2022-10-26 VITALS
DIASTOLIC BLOOD PRESSURE: 92 MMHG | OXYGEN SATURATION: 97 % | TEMPERATURE: 97.1 F | HEART RATE: 80 BPM | SYSTOLIC BLOOD PRESSURE: 142 MMHG | WEIGHT: 216 LBS | BODY MASS INDEX: 38.27 KG/M2 | RESPIRATION RATE: 16 BRPM | HEIGHT: 63 IN

## 2022-10-26 DIAGNOSIS — F41.9 ANXIETY: Primary | ICD-10-CM

## 2022-10-26 DIAGNOSIS — N92.0 MENORRHAGIA WITH REGULAR CYCLE: ICD-10-CM

## 2022-10-26 LAB
BASOPHILS # BLD AUTO: 0.04 10*3/MM3 (ref 0–0.2)
BASOPHILS NFR BLD AUTO: 0.4 % (ref 0–1.5)
DEPRECATED RDW RBC AUTO: 37.9 FL (ref 37–54)
EOSINOPHIL # BLD AUTO: 0.05 10*3/MM3 (ref 0–0.4)
EOSINOPHIL NFR BLD AUTO: 0.5 % (ref 0.3–6.2)
ERYTHROCYTE [DISTWIDTH] IN BLOOD BY AUTOMATED COUNT: 12.6 % (ref 12.3–15.4)
HCT VFR BLD AUTO: 42.4 % (ref 34–46.6)
HGB BLD-MCNC: 14.7 G/DL (ref 12–15.9)
IMM GRANULOCYTES # BLD AUTO: 0.02 10*3/MM3 (ref 0–0.05)
IMM GRANULOCYTES NFR BLD AUTO: 0.2 % (ref 0–0.5)
LYMPHOCYTES # BLD AUTO: 2.54 10*3/MM3 (ref 0.7–3.1)
LYMPHOCYTES NFR BLD AUTO: 27.7 % (ref 19.6–45.3)
MCH RBC QN AUTO: 28.5 PG (ref 26.6–33)
MCHC RBC AUTO-ENTMCNC: 34.7 G/DL (ref 31.5–35.7)
MCV RBC AUTO: 82.2 FL (ref 79–97)
MONOCYTES # BLD AUTO: 0.67 10*3/MM3 (ref 0.1–0.9)
MONOCYTES NFR BLD AUTO: 7.3 % (ref 5–12)
NEUTROPHILS NFR BLD AUTO: 5.86 10*3/MM3 (ref 1.7–7)
NEUTROPHILS NFR BLD AUTO: 63.9 % (ref 42.7–76)
NRBC BLD AUTO-RTO: 0 /100 WBC (ref 0–0.2)
PLATELET # BLD AUTO: 298 10*3/MM3 (ref 140–450)
PMV BLD AUTO: 10.7 FL (ref 6–12)
RBC # BLD AUTO: 5.16 10*6/MM3 (ref 3.77–5.28)
TSH SERPL DL<=0.05 MIU/L-ACNC: 0.61 UIU/ML (ref 0.27–4.2)
WBC NRBC COR # BLD: 9.18 10*3/MM3 (ref 3.4–10.8)

## 2022-10-26 PROCEDURE — 36415 COLL VENOUS BLD VENIPUNCTURE: CPT | Performed by: INTERNAL MEDICINE

## 2022-10-26 PROCEDURE — 84443 ASSAY THYROID STIM HORMONE: CPT | Performed by: INTERNAL MEDICINE

## 2022-10-26 PROCEDURE — 85025 COMPLETE CBC W/AUTO DIFF WBC: CPT | Performed by: INTERNAL MEDICINE

## 2022-10-26 PROCEDURE — 99214 OFFICE O/P EST MOD 30 MIN: CPT | Performed by: INTERNAL MEDICINE

## 2022-10-26 RX ORDER — BUSPIRONE HYDROCHLORIDE 5 MG/1
5 TABLET ORAL 3 TIMES DAILY PRN
Qty: 60 TABLET | Refills: 2 | Status: SHIPPED | OUTPATIENT
Start: 2022-10-26 | End: 2022-11-15

## 2022-10-26 RX ORDER — ARIPIPRAZOLE 5 MG/1
5 TABLET ORAL DAILY
Qty: 30 TABLET | Refills: 2 | Status: SHIPPED | OUTPATIENT
Start: 2022-10-26 | End: 2022-11-10

## 2022-10-26 RX ORDER — IBUPROFEN 600 MG/1
600 TABLET ORAL DAILY
COMMUNITY

## 2022-10-26 NOTE — PROGRESS NOTES
"Rooming Tab(CC,VS,Pt Hx,Fall Screen)  Chief Complaint   Patient presents with   • Anxiety       Subjective   Has been on prozac 20mg qd but is currently not taking. Patient states they made her feel more \"depressed\" than the actual symptoms. Patient states she sleeps a lot has little energy, eating less maybe 1x/day. No SI/HI. Patient has not been in counseling/therapy. Patient not interested in therapy at this time.     Patient is in custody kim for children and this is causing undue stress.  She is curently going through divorce.    Patient does not feel \"depressed\" she experiences more anxiety recently. She uses weed to cope with anxiety. Patient interested in PRN medication. Patient states she smokes 2x/day now which is greatly reduced.   I have reviewed and updated her medications, medical history and problem list during today's office visit.     Patient Care Team:  Kirstie Daniel MD as PCP - General    Problem List Tab  Medications Tab  Synopsis Tab  Chart Review Tab  Care Everywhere Tab  Immunizations Tab  Patient History Tab    Social History     Tobacco Use   • Smoking status: Former     Packs/day: 0.25     Types: Cigarettes     Quit date: 2017     Years since quittin.5   • Smokeless tobacco: Never   Substance Use Topics   • Alcohol use: No       Review of Systems   Psychiatric/Behavioral: Positive for depressed mood.        Anxiety       Objective     Rooming Tab(CC,VS,Pt Hx,Fall Screen)  /92   Pulse 80   Temp 97.1 °F (36.2 °C)   Resp 16   Ht 160 cm (63\")   Wt 98 kg (216 lb)   SpO2 97%   BMI 38.26 kg/m²     Body mass index is 38.26 kg/m².    Physical Exam  Vitals reviewed.   Constitutional:       Appearance: Normal appearance. She is obese.   HENT:      Head: Normocephalic and atraumatic.      Right Ear: External ear normal.      Left Ear: External ear normal.      Nose: Nose normal. No congestion.   Eyes:      Conjunctiva/sclera: Conjunctivae normal.   Cardiovascular:      " Rate and Rhythm: Normal rate and regular rhythm.      Pulses: Normal pulses.      Heart sounds: Normal heart sounds.   Pulmonary:      Effort: Pulmonary effort is normal.      Breath sounds: Normal breath sounds.   Abdominal:      General: Abdomen is flat. Bowel sounds are normal.      Palpations: Abdomen is soft.   Musculoskeletal:         General: Normal range of motion.      Cervical back: Normal range of motion and neck supple.   Skin:     General: Skin is warm and dry.      Capillary Refill: Capillary refill takes less than 2 seconds.   Neurological:      General: No focal deficit present.      Mental Status: She is alert and oriented to person, place, and time.   Psychiatric:      Comments: Tearful, pressured speech          Statin Choice Calculator  Data Reviewed:       The data below has been reviewed by Kirstie Daniel MD on 10/26/2022.          Assessment & Plan    Patient with excessive anxiety that is worsened by life stressors at this time. Using Cannabinoids exclusively to help with symptoms but would like to stop and get back on medication. Has tried several SSRIs and therapy without improvement so would like something different. She also notes she has been having excessive menstruation so will check TSH and CBC to see if anemia/thyroid pathology is contributing to fatigue.    Order Review Tab  Health Maintenance Tab  Patient Plan/Order Tab  Diagnoses and all orders for this visit:    1. Anxiety (Primary)  -     ARIPiprazole (Abilify) 5 MG tablet; Take 1 tablet by mouth Daily.  Dispense: 30 tablet; Refill: 2  -     busPIRone (BUSPAR) 5 MG tablet; Take 1 tablet by mouth 3 (Three) Times a Day As Needed (anxiety).  Dispense: 60 tablet; Refill: 2  -     TSH  -     CBC Auto Differential    2. Menorrhagia with regular cycle  -     TSH  -     CBC Auto Differential      Wrapup Tab  Return in about 3 months (around 1/26/2023), or if symptoms worsen or fail to improve.       They were informed of the  diagnosis and treatment plan and directed to f/u for any further problems or concerns.    I have seen and examined Jessie Villafana with resident, Dr. Omer and agree with findings and assessment and plan. Pt with increased anxiety going through divorce and custody kim now with CPS involved and worried about THC- trying to quit. Asking for prn med- explained benzos would also be in drug screen. Agreed to daily abilify with prn buspar. Will check tsh as well- increased cycles so will check for anemia    This document has been electronically signed by Kirstie Daniel MD on October 27, 2022 08:00 EDT

## 2022-11-10 ENCOUNTER — TELEPHONE (OUTPATIENT)
Dept: FAMILY MEDICINE CLINIC | Facility: CLINIC | Age: 32
End: 2022-11-10

## 2022-11-10 RX ORDER — ARIPIPRAZOLE 10 MG/1
10 TABLET ORAL DAILY
Qty: 30 TABLET | Refills: 4 | Status: SHIPPED | OUTPATIENT
Start: 2022-11-10 | End: 2023-05-07

## 2022-11-15 ENCOUNTER — TELEPHONE (OUTPATIENT)
Dept: FAMILY MEDICINE CLINIC | Facility: CLINIC | Age: 32
End: 2022-11-15

## 2022-11-15 DIAGNOSIS — F41.9 ANXIETY: ICD-10-CM

## 2022-11-15 RX ORDER — BUSPIRONE HYDROCHLORIDE 5 MG/1
5 TABLET ORAL 3 TIMES DAILY PRN
Qty: 60 TABLET | Refills: 2 | Status: CANCELLED | OUTPATIENT
Start: 2022-11-15

## 2022-11-15 RX ORDER — BUSPIRONE HYDROCHLORIDE 15 MG/1
15 TABLET ORAL 2 TIMES DAILY
Qty: 60 TABLET | Refills: 5 | Status: SHIPPED | OUTPATIENT
Start: 2022-11-15 | End: 2023-05-14

## 2022-11-15 NOTE — TELEPHONE ENCOUNTER
Caller: EfrainJessie anderson    Relationship: Self    Best call back number: [phone]    Requested Prescriptions:   Requested Prescriptions     Pending Prescriptions Disp Refills   • busPIRone (BUSPAR) 5 MG tablet 60 tablet 2     Sig: Take 1 tablet by mouth 3 (Three) Times a Day As Needed (anxiety).        Pharmacy where request should be sent: [facility]ACY #[id number] - [place], [place] - [address] [place] [address] [phone] [facility] [phone] FX     Additional details provided by patient: PATIENT HAS 2 TABLETS LEFT      SHE IS REQUESTING A NEW WRITTEN PRESCRIPTION FOR THIS MEDICATION IN A 15 MG TABLET.    HER AND DR BOBBY HAVE DISCUSSED POTENTIALLY INCREASING THIS.    PATIENT WANTED TO MENTION THAT THERE HAVE BEEN A FEW DAYS WHERE SHE HAS TAKEN 2 TABLETS TO MAKE 10 MG, AND IT STILL JUST DOESN'T HAVE HER WHERE SHE NEEDS TO BE.    SHE'S STILL BITING AT HER NAILS AND WONDERED IF WE COULD MAKE THAT JUMP TO 15 MG      PLEASE GIVE HER A CALLBACK.     Does the patient have less than a 3 day supply:  [x] Yes  [] No    Nataliia James, PCT   11/15/22 13:31 EST

## 2022-11-15 NOTE — TELEPHONE ENCOUNTER
Caller: Jessie Villafana    Relationship: Self    Best call back number: [phone]    PATIENT DIDN'T KNOW IF WE COULD ADD A SECOND ANXIETY MEDICATION FOR HER.    SHE STATES ITS DIFFICULT TO EXPLAIN HOW SHE FEELS BUT ITS NOT A GOOD FEELING, LIKE SHE'S ANXIOUS ALL THE TIME AND HER MIND IS ALWAYS GOING.    SHE IS CURRENTLY ON BUSPAR, SHE STATES SHE MAY HAVE ADHD TENDENCIES AND JUST REALLY STRUGGLES TO FOCUS AND GET LIKE ONE SET THING ACCOMPLISHED.     SHE WANTED TO MENTION SHE STARTS COUNSELING IN ABOUT 3 WEEKS.    PATIENT DOES NOT HAVE FEELINGS OR THOUGHTS OF HURTING HERSELF OR OTHERS JUST HAVING TROUBLE FOCUSING.     PLEASE ADVISE

## 2022-11-15 NOTE — TELEPHONE ENCOUNTER
Caller: Jessie Villafana    Relationship: Self    Best call back number: [phone]    PATIENT HAS CHANGED HER PREFERRED PHARMACY TO [facility] [place]    [id number] - [place], [place] - [address] [place] [address] - [phone]  - [phone] FX    PLEASE MAKE SURE ALL HER SCRIPTS GET TRANSFERRED HER, MOVING FORWARD.    SHE IS KEEPING [facility] [place] ON THE CHART AS BACK UP.

## 2022-11-16 NOTE — TELEPHONE ENCOUNTER
HUB TO SHARE: LEFT VM TO CALL BACK. PLEASE GIVE FOLLOWING MESSAGE FROM DR BOBBY. Lets increase the buspar first before adding a second medicine

## 2022-11-16 NOTE — TELEPHONE ENCOUNTER
Caller: Jessie Villafana    Relationship: Self    Best call back number: [phone]    What was the call regarding: HUB RELAYED MESSAGE TO PATIENT.     Do you require a callback: NO

## 2023-05-07 RX ORDER — ARIPIPRAZOLE 10 MG/1
TABLET ORAL
Qty: 30 TABLET | Refills: 4 | Status: SHIPPED | OUTPATIENT
Start: 2023-05-07

## 2023-07-24 RX ORDER — BUSPIRONE HYDROCHLORIDE 15 MG/1
15 TABLET ORAL 2 TIMES DAILY
Qty: 60 TABLET | Refills: 0 | Status: SHIPPED | OUTPATIENT
Start: 2023-07-24 | End: 2024-01-20

## 2023-08-23 RX ORDER — BUSPIRONE HYDROCHLORIDE 15 MG/1
15 TABLET ORAL 2 TIMES DAILY
Qty: 60 TABLET | Refills: 2 | Status: SHIPPED | OUTPATIENT
Start: 2023-08-23 | End: 2024-02-19

## 2023-08-23 NOTE — TELEPHONE ENCOUNTER
Caller: LedyJessie    Relationship: Self    Best call back number: [phone]    Requested Prescriptions:   Requested Prescriptions     Pending Prescriptions Disp Refills    busPIRone (BUSPAR) 15 MG tablet 60 tablet 0     Sig: Take 1 tablet by mouth 2 (Two) Times a Day for 180 days.        Pharmacy where request should be sent: [facility]/PHARMACY #[id number] - [facility] [place] [address] [phone] [facility] [phone]      Last office visit with prescribing clinician: 10/26/2022   Last telemedicine visit with prescribing clinician: Visit date not found   Next office visit with prescribing clinician: Visit date not found     Additional details provided by patient: WILL NEED FILLED, UPCOMING APPT. OFFBOARDING DR BOBBY     Does the patient have less than a 3 day supply:  [x] Yes  [] No    Would you like a call back once the refill request has been completed: [] Yes [x] No    If the office needs to give you a call back, can they leave a voicemail: [] Yes [x] No    Nancy Nicole   08/23/23 12:25 EDT

## 2024-03-19 ENCOUNTER — OFFICE VISIT (OUTPATIENT)
Dept: FAMILY MEDICINE CLINIC | Facility: CLINIC | Age: 34
End: 2024-03-19
Payer: MEDICAID

## 2024-03-19 VITALS
BODY MASS INDEX: 38.27 KG/M2 | HEIGHT: 63 IN | RESPIRATION RATE: 18 BRPM | HEART RATE: 83 BPM | SYSTOLIC BLOOD PRESSURE: 132 MMHG | OXYGEN SATURATION: 97 % | DIASTOLIC BLOOD PRESSURE: 86 MMHG | WEIGHT: 216 LBS

## 2024-03-19 DIAGNOSIS — F41.9 ANXIETY: Primary | ICD-10-CM

## 2024-03-19 PROBLEM — U07.1 GASTROENTERITIS DUE TO COVID-19 VIRUS: Status: RESOLVED | Noted: 2020-04-23 | Resolved: 2024-03-19

## 2024-03-19 PROBLEM — J40 BRONCHITIS: Status: RESOLVED | Noted: 2020-04-23 | Resolved: 2024-03-19

## 2024-03-19 PROBLEM — A08.39 GASTROENTERITIS DUE TO COVID-19 VIRUS: Status: RESOLVED | Noted: 2020-04-23 | Resolved: 2024-03-19

## 2024-03-19 RX ORDER — BUSPIRONE HYDROCHLORIDE 15 MG/1
15 TABLET ORAL 2 TIMES DAILY
Qty: 60 TABLET | Refills: 3 | Status: SHIPPED | OUTPATIENT
Start: 2024-03-19

## 2024-03-19 NOTE — PROGRESS NOTES
"[facility]  Established Patient Visit  03/19/2024     Patient Name: Jessie Villafana   YOB: 1990   Medical Record Number: [id number]   Primary Care Physician: Svetlana Soler MD       Subjective   Chief Complaint     Chief Complaint   Patient presents with    Establish Care         History of Present Illness     Depression/Anxiety    Plantar Warts    GYN: follows yearly, HPV +      Review of Systems   A medically appropriate and patient-specific review of systems was performed. Pertinent findings are mentioned in the HPI, with no additional significant findings beyond those already noted.    Patient History   The following portions of the patient's history were reviewed and updated as appropriate:   allergies, current medications, problem list, PMHx, PSHx, PFHx, past social history      Objective   Vitals     Vitals:    03/19/24 1117   BP: 132/86   Pulse: 83   Resp: 18   SpO2: 97%   Weight: 98 kg (216 lb)   Height: 160 cm (63\")      BMI Readings from Last 3 Encounters:   03/19/24 38.26 kg/m²   10/26/22 38.26 kg/m²   01/21/22 40.03 kg/m²         Physical Exam     Constitutional: Alert, well-appearing, no acute distress  Eyes: Vision grossly intact, sclerae anicteric, no conjunctival injection  HENT: NCAT, mucous membranes moist, normal dentition, posterior pharynx normal, bilateral TMs normal  Neck: Supple, no thyromegaly, no lymphadenopathy, trachea midline  Respiratory: No respiratory distress, good effort and air entry, clear to auscultation bilaterally   Cardiovascular: RRR, no murmurs, normal peripheral perfusion, no LE edema  Gastrointestinal: Soft, nondistended, nontender, bowel sounds present  Musculoskeletal: Strength grossly intact, appropriate ROM in all extremities, no obvious deformities or injuries  Psychiatric: Appropriate mood and affect, cooperative  Neurologic: At baseline, motor and sensory function grossly intact, moves all extremities " equally  Skin: No apparent rashes or lesions        Assessment & Plan       Diagnoses and all orders for this visit:    Anxiety    Other orders  -     busPIRone (BUSPAR) 15 MG tablet; Take 1 tablet by mouth 2 (Two) Times a Day.        No orders of the defined types were placed in this encounter.       Follow Up   No follow-ups on file.    Patient was given instructions and counseling regarding her condition or for health maintenance advice. Please see specific information pulled into the AVS if appropriate.         This medical documentation was produced in part using speech recognition software (Dragon Dictation System) and may contain errors related to that system including errors in grammar, punctuation, and spelling, as well as words and phrases that may be inappropriate and not intentional --- If there are any questions or concerns please feel free to contact me, the dictating provider, for clarification.      Patient or patient representative verbalized consent for the use of Ambient Listening during the visit with  Svetlana Soler MD for chart documentation. 4/7/2024  22:48 EDT    vSetlana Soler MD       History of Present Illness      Assessment & Plan

## 2024-08-16 ENCOUNTER — LAB (OUTPATIENT)
Dept: FAMILY MEDICINE CLINIC | Facility: CLINIC | Age: 34
End: 2024-08-16
Payer: MEDICAID

## 2024-08-16 ENCOUNTER — OFFICE VISIT (OUTPATIENT)
Dept: FAMILY MEDICINE CLINIC | Facility: CLINIC | Age: 34
End: 2024-08-16
Payer: MEDICAID

## 2024-08-16 VITALS
SYSTOLIC BLOOD PRESSURE: 116 MMHG | RESPIRATION RATE: 18 BRPM | HEART RATE: 80 BPM | OXYGEN SATURATION: 97 % | BODY MASS INDEX: 39.76 KG/M2 | HEIGHT: 63 IN | DIASTOLIC BLOOD PRESSURE: 64 MMHG | WEIGHT: 224.38 LBS

## 2024-08-16 DIAGNOSIS — Z00.00 ENCOUNTER FOR ANNUAL PHYSICAL EXAM: Primary | ICD-10-CM

## 2024-08-16 DIAGNOSIS — F41.9 ANXIETY: ICD-10-CM

## 2024-08-16 DIAGNOSIS — Z00.00 ENCOUNTER FOR ANNUAL PHYSICAL EXAM: ICD-10-CM

## 2024-08-16 PROCEDURE — 85025 COMPLETE CBC W/AUTO DIFF WBC: CPT | Performed by: STUDENT IN AN ORGANIZED HEALTH CARE EDUCATION/TRAINING PROGRAM

## 2024-08-16 PROCEDURE — 83036 HEMOGLOBIN GLYCOSYLATED A1C: CPT | Performed by: STUDENT IN AN ORGANIZED HEALTH CARE EDUCATION/TRAINING PROGRAM

## 2024-08-16 PROCEDURE — 80061 LIPID PANEL: CPT | Performed by: STUDENT IN AN ORGANIZED HEALTH CARE EDUCATION/TRAINING PROGRAM

## 2024-08-16 PROCEDURE — 36415 COLL VENOUS BLD VENIPUNCTURE: CPT

## 2024-08-16 PROCEDURE — 86803 HEPATITIS C AB TEST: CPT | Performed by: STUDENT IN AN ORGANIZED HEALTH CARE EDUCATION/TRAINING PROGRAM

## 2024-08-16 PROCEDURE — 1160F RVW MEDS BY RX/DR IN RCRD: CPT | Performed by: STUDENT IN AN ORGANIZED HEALTH CARE EDUCATION/TRAINING PROGRAM

## 2024-08-16 PROCEDURE — 99395 PREV VISIT EST AGE 18-39: CPT | Performed by: STUDENT IN AN ORGANIZED HEALTH CARE EDUCATION/TRAINING PROGRAM

## 2024-08-16 PROCEDURE — 80053 COMPREHEN METABOLIC PANEL: CPT | Performed by: STUDENT IN AN ORGANIZED HEALTH CARE EDUCATION/TRAINING PROGRAM

## 2024-08-16 PROCEDURE — 1159F MED LIST DOCD IN RCRD: CPT | Performed by: STUDENT IN AN ORGANIZED HEALTH CARE EDUCATION/TRAINING PROGRAM

## 2024-08-16 PROCEDURE — 2014F MENTAL STATUS ASSESS: CPT | Performed by: STUDENT IN AN ORGANIZED HEALTH CARE EDUCATION/TRAINING PROGRAM

## 2024-08-16 RX ORDER — BUSPIRONE HYDROCHLORIDE 15 MG/1
15 TABLET ORAL 3 TIMES DAILY
Qty: 90 TABLET | Refills: 5 | Status: SHIPPED | OUTPATIENT
Start: 2024-08-16

## 2024-08-16 NOTE — PROGRESS NOTES
"[facility]  08/16/2024     Patient Name: Jessie Villafana   YOB: 1990   Medical Record Number: [id number]   Primary Care Physician: Svetlana Soler MD     Subjective   Chief Complaint     Chief Complaint   Patient presents with    Annual Exam         History of Present Illness     Anxiety  - Currently taking buspirone  - Dose of 10 mg BID provides some benefit but still having symptoms  - Denies suicidal or homicidal ideation  - Medication adherence variable, stops when feeling better    - Has tried other medications like sertraline in the past  - Wants to avoid controlled substances due to personal and family history of addiction    SOCIAL HISTORY: Denies tobacco, alcohol or drug use.    Immunizations: UTD    Cancer screening:  Pap smear UTD; follows with GYN: appt at end of this of month  - breast cancer in grandmother in her 30's    Diet: working on eating healthy  Exercise: physical active but no regular exercise  Mood: as above                  Review of Systems   A medically appropriate and patient-specific review of systems was performed. Pertinent findings are mentioned in the HPI, with no additional significant findings beyond those already noted.    Patient History   The following portions of the patient's history were reviewed and updated as appropriate:   allergies, current medications, problem list, PMHx, PSHx, PFHx, past social history      Objective   Vitals     Vitals:    08/16/24 1101   BP: 116/64   Pulse: 80   Resp: 18   SpO2: 97%   Weight: 102 kg (224 lb 6 oz)   Height: 160 cm (63\")      BMI Readings from Last 3 Encounters:   08/16/24 39.75 kg/m²   03/19/24 38.26 kg/m²   10/26/22 38.26 kg/m²     Class 2 Severe Obesity (BMI >=35 and <=39.9). Obesity-related health conditions include the following: none. Obesity is unchanged. BMI is is above average; BMI management plan is completed. We discussed low calorie, low carb based diet program, portion " control, increasing exercise, and joining a fitness center or start home based exercise program.       Physical Exam   Constitutional: Alert, well-appearing, no acute distress  HENT: NCAT, mucous membranes moist  Neck: Supple, no thyromegaly  Respiratory: No respiratory distress, good effort and air entry, clear to auscultation bilaterally   Cardiovascular: RRR, no LE edema  Psychiatric: Appropriate mood and affect, cooperative  Skin: No apparent rashes or lesions        Assessment & Plan     Diagnoses and all orders for this visit:    Encounter for annual physical exam  Cancer and preventative screenings were reviewed and ordered as indicated (see below). Immunizations were reviewed and education/instructions were provided on any recommended vaccines that are currenlty due. Routine labs ordered today for monitoring and screening as below. Counseled patient on diet, exercise, weight, vaccines, disease/screening prevention, safety, risk reduction, dental/vision care, and mental health. Addressed all patient/family questions.   Cancer Screenings  - Breast: Will discuss with gynecologist, has family history of early breast cancer  - Cervical: Pap smear planned at upcoming gynecology visit. Patient desires hysterectomy but unable to take adequate time off work currently  -     CBC Auto Differential; Future  -     Comprehensive Metabolic Panel; Future  -     Hepatitis C Antibody; Future  -     Hemoglobin A1c; Future  -     Lipid Panel; Future    Anxiety  Chronic, partially controlled on current regimen  PLAN:  - Increase buspirone to 10 mg TID scheduled   - Encourage medication adherence even when feeling well  - Discuss non-pharmacologic management strategies  - Follow up in 1 month or sooner if symptoms worsen  -     busPIRone (BUSPAR) 15 MG tablet; Take 1 tablet by mouth 3 (Three) Times a Day.          Orders Placed This Encounter   Procedures    CBC Auto Differential    Comprehensive Metabolic Panel    Hepatitis C  Antibody    Hemoglobin A1c    Lipid Panel          In addition to the above, I also completed the following during today's visit: educated the patient and/or family on the overall impression and recommended plan of care, encouraged the patient/family to voice questions, and addressed all inquiries. I reviewed the appropriate use of any current medications and emphasized important side effects to be aware of, provided education on any new medications that were started, including their indications, proper administration, dosing, and potential side effects as applicable. We reviewed return precautions and reasons to seek urgent/emergent care, and if indicated, I reiterated the importance of maintaining a healthy diet and regular physical activity on chronic conditions and overall well-being.     Follow Up   Return in about 1 year (around 8/16/2025) for Annual physical.      This medical documentation was produced in part using speech recognition software (Dragon Dictation System) and may contain errors related to that system including errors in grammar, punctuation, and spelling, as well as words and phrases that may be inappropriate and not intentional --- If there are any questions or concerns please feel free to contact me, the dictating provider, for clarification.        Disclaimer For Patients: Per the Federal 21st Century CURES Act and as of April 2021, medical records (including provider notes and laboratory/imaging results) are to be made available to patient’s and/or their designees as soon as the documents are signed/resulted. While the intention is to ensure transparency and to engage patients in their healthcare, this immediate access may create unintended consequences. This document uses language intended for communication between medical experts and diagnostic results are often interpreted with the entirety of the patient’s clinical picture in mind. It is recommended that patients and/or their designees  review all available information with their primary or specialist providers for explanation and guidance to avoid misinterpretation based on layperson understanding, non-medical expert opinions, or internet searches.      Svetlana Soler MD

## 2024-08-17 LAB
ALBUMIN SERPL-MCNC: 4.3 G/DL (ref 3.5–5.2)
ALBUMIN/GLOB SERPL: 1.7 G/DL
ALP SERPL-CCNC: 73 U/L (ref 39–117)
ALT SERPL W P-5'-P-CCNC: 23 U/L (ref 1–33)
ANION GAP SERPL CALCULATED.3IONS-SCNC: 10.8 MMOL/L (ref 5–15)
AST SERPL-CCNC: 16 U/L (ref 1–32)
BASOPHILS # BLD AUTO: 0.04 10*3/MM3 (ref 0–0.2)
BASOPHILS NFR BLD AUTO: 0.6 % (ref 0–1.5)
BILIRUB SERPL-MCNC: <0.2 MG/DL (ref 0–1.2)
BUN SERPL-MCNC: 11 MG/DL (ref 6–20)
BUN/CREAT SERPL: 15.9 (ref 7–25)
CALCIUM SPEC-SCNC: 9.1 MG/DL (ref 8.6–10.5)
CHLORIDE SERPL-SCNC: 103 MMOL/L (ref 98–107)
CHOLEST SERPL-MCNC: 181 MG/DL (ref 0–200)
CO2 SERPL-SCNC: 25.2 MMOL/L (ref 22–29)
CREAT SERPL-MCNC: 0.69 MG/DL (ref 0.57–1)
DEPRECATED RDW RBC AUTO: 40.8 FL (ref 37–54)
EGFRCR SERPLBLD CKD-EPI 2021: 117.7 ML/MIN/1.73
EOSINOPHIL # BLD AUTO: 0.09 10*3/MM3 (ref 0–0.4)
EOSINOPHIL NFR BLD AUTO: 1.4 % (ref 0.3–6.2)
ERYTHROCYTE [DISTWIDTH] IN BLOOD BY AUTOMATED COUNT: 12.9 % (ref 12.3–15.4)
GLOBULIN UR ELPH-MCNC: 2.6 GM/DL
GLUCOSE SERPL-MCNC: 73 MG/DL (ref 65–99)
HBA1C MFR BLD: 5 % (ref 4.8–5.6)
HCT VFR BLD AUTO: 43 % (ref 34–46.6)
HCV AB SER QL: NORMAL
HDLC SERPL-MCNC: 41 MG/DL (ref 40–60)
HGB BLD-MCNC: 14.1 G/DL (ref 12–15.9)
IMM GRANULOCYTES # BLD AUTO: 0.02 10*3/MM3 (ref 0–0.05)
IMM GRANULOCYTES NFR BLD AUTO: 0.3 % (ref 0–0.5)
LDLC SERPL CALC-MCNC: 121 MG/DL (ref 0–100)
LDLC/HDLC SERPL: 2.9 {RATIO}
LYMPHOCYTES # BLD AUTO: 2.44 10*3/MM3 (ref 0.7–3.1)
LYMPHOCYTES NFR BLD AUTO: 36.8 % (ref 19.6–45.3)
MCH RBC QN AUTO: 28.7 PG (ref 26.6–33)
MCHC RBC AUTO-ENTMCNC: 32.8 G/DL (ref 31.5–35.7)
MCV RBC AUTO: 87.6 FL (ref 79–97)
MONOCYTES # BLD AUTO: 0.55 10*3/MM3 (ref 0.1–0.9)
MONOCYTES NFR BLD AUTO: 8.3 % (ref 5–12)
NEUTROPHILS NFR BLD AUTO: 3.49 10*3/MM3 (ref 1.7–7)
NEUTROPHILS NFR BLD AUTO: 52.6 % (ref 42.7–76)
NRBC BLD AUTO-RTO: 0 /100 WBC (ref 0–0.2)
PLATELET # BLD AUTO: 258 10*3/MM3 (ref 140–450)
PMV BLD AUTO: 10.6 FL (ref 6–12)
POTASSIUM SERPL-SCNC: 4.1 MMOL/L (ref 3.5–5.2)
PROT SERPL-MCNC: 6.9 G/DL (ref 6–8.5)
RBC # BLD AUTO: 4.91 10*6/MM3 (ref 3.77–5.28)
SODIUM SERPL-SCNC: 139 MMOL/L (ref 136–145)
TRIGL SERPL-MCNC: 105 MG/DL (ref 0–150)
VLDLC SERPL-MCNC: 19 MG/DL (ref 5–40)
WBC NRBC COR # BLD AUTO: 6.63 10*3/MM3 (ref 3.4–10.8)

## 2024-11-26 ENCOUNTER — LAB (OUTPATIENT)
Dept: FAMILY MEDICINE CLINIC | Facility: CLINIC | Age: 34
End: 2024-11-26
Payer: MEDICAID

## 2024-11-26 DIAGNOSIS — R60.0 EDEMA OF ALL FOUR EXTREMITIES: ICD-10-CM

## 2024-11-26 DIAGNOSIS — R20.2 PARESTHESIAS: ICD-10-CM

## 2024-11-26 DIAGNOSIS — R60.0 PERIORBITAL EDEMA OF RIGHT EYE: Primary | ICD-10-CM

## 2024-11-26 LAB
ALBUMIN SERPL-MCNC: 4.4 G/DL (ref 3.5–5.2)
ALBUMIN/GLOB SERPL: 1.6 G/DL
ALP SERPL-CCNC: 81 U/L (ref 39–117)
ALT SERPL W P-5'-P-CCNC: 32 U/L (ref 1–33)
ANION GAP SERPL CALCULATED.3IONS-SCNC: 10 MMOL/L (ref 5–15)
AST SERPL-CCNC: 15 U/L (ref 1–32)
BASOPHILS # BLD AUTO: 0.05 10*3/MM3 (ref 0–0.2)
BASOPHILS NFR BLD AUTO: 0.7 % (ref 0–1.5)
BILIRUB SERPL-MCNC: 0.2 MG/DL (ref 0–1.2)
BUN SERPL-MCNC: 9 MG/DL (ref 6–20)
BUN/CREAT SERPL: 14.5 (ref 7–25)
C3 SERPL-MCNC: 145 MG/DL (ref 82–167)
C4 SERPL-MCNC: 26 MG/DL (ref 14–44)
CALCIUM SPEC-SCNC: 9.1 MG/DL (ref 8.6–10.5)
CHLORIDE SERPL-SCNC: 104 MMOL/L (ref 98–107)
CO2 SERPL-SCNC: 23 MMOL/L (ref 22–29)
CREAT SERPL-MCNC: 0.62 MG/DL (ref 0.57–1)
CRP SERPL-MCNC: <0.3 MG/DL (ref 0–0.5)
DEPRECATED RDW RBC AUTO: 38.2 FL (ref 37–54)
EGFRCR SERPLBLD CKD-EPI 2021: 120.8 ML/MIN/1.73
EOSINOPHIL # BLD AUTO: 0.07 10*3/MM3 (ref 0–0.4)
EOSINOPHIL NFR BLD AUTO: 1 % (ref 0.3–6.2)
ERYTHROCYTE [DISTWIDTH] IN BLOOD BY AUTOMATED COUNT: 12.4 % (ref 12.3–15.4)
ERYTHROCYTE [SEDIMENTATION RATE] IN BLOOD: 10 MM/HR (ref 0–20)
GLOBULIN UR ELPH-MCNC: 2.7 GM/DL
GLUCOSE SERPL-MCNC: 99 MG/DL (ref 65–99)
HCT VFR BLD AUTO: 43.2 % (ref 34–46.6)
HGB BLD-MCNC: 14.6 G/DL (ref 12–15.9)
IMM GRANULOCYTES # BLD AUTO: 0.02 10*3/MM3 (ref 0–0.05)
IMM GRANULOCYTES NFR BLD AUTO: 0.3 % (ref 0–0.5)
LYMPHOCYTES # BLD AUTO: 2.07 10*3/MM3 (ref 0.7–3.1)
LYMPHOCYTES NFR BLD AUTO: 30.9 % (ref 19.6–45.3)
MCH RBC QN AUTO: 29 PG (ref 26.6–33)
MCHC RBC AUTO-ENTMCNC: 33.8 G/DL (ref 31.5–35.7)
MCV RBC AUTO: 85.9 FL (ref 79–97)
MONOCYTES # BLD AUTO: 0.42 10*3/MM3 (ref 0.1–0.9)
MONOCYTES NFR BLD AUTO: 6.3 % (ref 5–12)
NEUTROPHILS NFR BLD AUTO: 4.07 10*3/MM3 (ref 1.7–7)
NEUTROPHILS NFR BLD AUTO: 60.8 % (ref 42.7–76)
NRBC BLD AUTO-RTO: 0 /100 WBC (ref 0–0.2)
PLATELET # BLD AUTO: 355 10*3/MM3 (ref 140–450)
PMV BLD AUTO: 10.4 FL (ref 6–12)
POTASSIUM SERPL-SCNC: 4.1 MMOL/L (ref 3.5–5.2)
PROT SERPL-MCNC: 7.1 G/DL (ref 6–8.5)
RBC # BLD AUTO: 5.03 10*6/MM3 (ref 3.77–5.28)
SODIUM SERPL-SCNC: 137 MMOL/L (ref 136–145)
T4 FREE SERPL-MCNC: 1.23 NG/DL (ref 0.92–1.68)
TSH SERPL DL<=0.05 MIU/L-ACNC: 0.65 UIU/ML (ref 0.27–4.2)
WBC NRBC COR # BLD AUTO: 6.7 10*3/MM3 (ref 3.4–10.8)

## 2024-11-26 PROCEDURE — 80050 GENERAL HEALTH PANEL: CPT | Performed by: STUDENT IN AN ORGANIZED HEALTH CARE EDUCATION/TRAINING PROGRAM

## 2024-11-26 PROCEDURE — 86160 COMPLEMENT ANTIGEN: CPT | Performed by: STUDENT IN AN ORGANIZED HEALTH CARE EDUCATION/TRAINING PROGRAM

## 2024-11-26 PROCEDURE — 85652 RBC SED RATE AUTOMATED: CPT | Performed by: STUDENT IN AN ORGANIZED HEALTH CARE EDUCATION/TRAINING PROGRAM

## 2024-11-26 PROCEDURE — 86038 ANTINUCLEAR ANTIBODIES: CPT | Performed by: STUDENT IN AN ORGANIZED HEALTH CARE EDUCATION/TRAINING PROGRAM

## 2024-11-26 PROCEDURE — 36415 COLL VENOUS BLD VENIPUNCTURE: CPT

## 2024-11-26 PROCEDURE — 86140 C-REACTIVE PROTEIN: CPT | Performed by: STUDENT IN AN ORGANIZED HEALTH CARE EDUCATION/TRAINING PROGRAM

## 2024-11-26 PROCEDURE — 86225 DNA ANTIBODY NATIVE: CPT | Performed by: STUDENT IN AN ORGANIZED HEALTH CARE EDUCATION/TRAINING PROGRAM

## 2024-11-26 PROCEDURE — 84439 ASSAY OF FREE THYROXINE: CPT | Performed by: STUDENT IN AN ORGANIZED HEALTH CARE EDUCATION/TRAINING PROGRAM

## 2024-11-27 LAB
ANA SER QL: NEGATIVE
DSDNA AB SER-ACNC: 1 IU/ML (ref 0–9)